# Patient Record
Sex: FEMALE | Race: WHITE | NOT HISPANIC OR LATINO | URBAN - METROPOLITAN AREA
[De-identification: names, ages, dates, MRNs, and addresses within clinical notes are randomized per-mention and may not be internally consistent; named-entity substitution may affect disease eponyms.]

---

## 2018-06-15 ENCOUNTER — EMERGENCY (EMERGENCY)
Facility: HOSPITAL | Age: 68
LOS: 1 days | Discharge: HOME | End: 2018-06-15
Attending: EMERGENCY MEDICINE | Admitting: EMERGENCY MEDICINE
Payer: MEDICARE

## 2018-06-15 VITALS
SYSTOLIC BLOOD PRESSURE: 161 MMHG | TEMPERATURE: 98 F | HEART RATE: 69 BPM | RESPIRATION RATE: 20 BRPM | OXYGEN SATURATION: 97 % | DIASTOLIC BLOOD PRESSURE: 81 MMHG

## 2018-06-15 VITALS — HEIGHT: 61 IN | WEIGHT: 179.9 LBS

## 2018-06-15 PROCEDURE — 93971 EXTREMITY STUDY: CPT | Mod: 26,RT

## 2018-06-15 PROCEDURE — 93971 EXTREMITY STUDY: CPT

## 2018-06-15 PROCEDURE — 99284 EMERGENCY DEPT VISIT MOD MDM: CPT

## 2018-06-15 PROCEDURE — 99284 EMERGENCY DEPT VISIT MOD MDM: CPT | Mod: 25

## 2018-06-15 RX ORDER — KETOROLAC TROMETHAMINE 30 MG/ML
30 SYRINGE (ML) INJECTION ONCE
Qty: 0 | Refills: 0 | Status: COMPLETED | OUTPATIENT
Start: 2018-06-15 | End: 2018-06-15

## 2018-06-15 NOTE — ED STATDOCS - CHPI ED SYMPTOM NEG
no fever/no nausea/no hematuria/no dysuria/no vomiting/no weakness/no palpitations/no chills/no decreased eating/drinking/no numbness

## 2018-06-15 NOTE — ED ADULT NURSE REASSESSMENT NOTE - NS ED NURSE REASSESS COMMENT FT1
pt a+ox3, sitting comfortably in bed. pt awaiting sono, denies any pain @ this time. will continue to monitor.

## 2018-06-15 NOTE — ED ADULT NURSE NOTE - OBJECTIVE STATEMENT
went to [facility] health ans was told to come to er for ultrasound of right leg - more increased swelling - per patient right leg has metal and previous surgeries. states that the swelling is greater then normal

## 2018-06-15 NOTE — ED STATDOCS - OBJECTIVE STATEMENT
USOH until about two days ago when noted increase leg swelling, pain, and slight limp.  History of plates and screws for fracture of affected extremity, denies new trauma, fall, lifting.  Takes a waterpill for htn/leg edema, compliant with meds.  Denies chest pain, sob, recent travel.  W[place] [facility] [place] [place] family member's urging, referred to ED for study to evaluate for DVT.

## 2018-06-15 NOTE — ED STATDOCS - MEDICAL DECISION MAKING DETAILS
leg swelling, right  [place] [facility] [place]  dopplers, pain meds  check results, reassess, f/u

## 2023-06-08 NOTE — ED STATDOCS - SPINE APPEARANCE, MLM
Requested the result copy forward to Dr. Rush and DEBBIE Crouch to review to adjust the dosage of prednisone accordingly.   spine normal

## 2024-02-09 ENCOUNTER — EMERGENCY (EMERGENCY)
Facility: HOSPITAL | Age: 74
LOS: 1 days | Discharge: HOME | End: 2024-02-09
Attending: EMERGENCY MEDICINE
Payer: MEDICARE

## 2024-02-09 VITALS
HEIGHT: 61 IN | TEMPERATURE: 98 F | WEIGHT: 169.98 LBS | RESPIRATION RATE: 20 BRPM | HEART RATE: 100 BPM | DIASTOLIC BLOOD PRESSURE: 85 MMHG | SYSTOLIC BLOOD PRESSURE: 152 MMHG | OXYGEN SATURATION: 96 %

## 2024-02-09 PROBLEM — I10 ESSENTIAL (PRIMARY) HYPERTENSION: Chronic | Status: ACTIVE | Noted: 2018-06-15

## 2024-02-09 PROBLEM — M19.90 UNSPECIFIED OSTEOARTHRITIS, UNSPECIFIED SITE: Chronic | Status: ACTIVE | Noted: 2018-06-15

## 2024-02-09 LAB
ALBUMIN SERPL ELPH-MCNC: 3.9 G/DL — SIGNIFICANT CHANGE UP (ref 3.3–5.2)
ALP SERPL-CCNC: 86 U/L — SIGNIFICANT CHANGE UP (ref 40–120)
ALT FLD-CCNC: 9 U/L — SIGNIFICANT CHANGE UP
ANION GAP SERPL CALC-SCNC: 13 MMOL/L — SIGNIFICANT CHANGE UP (ref 5–17)
AST SERPL-CCNC: 20 U/L — SIGNIFICANT CHANGE UP
BASE EXCESS BLDV CALC-SCNC: 4.7 MMOL/L — HIGH (ref -2–3)
BASOPHILS # BLD AUTO: 0.05 K/UL — SIGNIFICANT CHANGE UP (ref 0–0.2)
BASOPHILS NFR BLD AUTO: 0.8 % — SIGNIFICANT CHANGE UP (ref 0–2)
BILIRUB SERPL-MCNC: 0.4 MG/DL — SIGNIFICANT CHANGE UP (ref 0.4–2)
BUN SERPL-MCNC: 12.7 MG/DL — SIGNIFICANT CHANGE UP (ref 8–20)
CA-I SERPL-SCNC: 1.15 MMOL/L — SIGNIFICANT CHANGE UP (ref 1.15–1.33)
CALCIUM SERPL-MCNC: 9.4 MG/DL — SIGNIFICANT CHANGE UP (ref 8.4–10.5)
CHLORIDE BLDV-SCNC: 100 MMOL/L — SIGNIFICANT CHANGE UP (ref 96–108)
CHLORIDE SERPL-SCNC: 97 MMOL/L — SIGNIFICANT CHANGE UP (ref 96–108)
CO2 SERPL-SCNC: 26 MMOL/L — SIGNIFICANT CHANGE UP (ref 22–29)
CREAT SERPL-MCNC: 0.56 MG/DL — SIGNIFICANT CHANGE UP (ref 0.5–1.3)
CRP SERPL-MCNC: 29 MG/L — HIGH
EGFR: 98 ML/MIN/1.73M2 — SIGNIFICANT CHANGE UP
EOSINOPHIL # BLD AUTO: 0.18 K/UL — SIGNIFICANT CHANGE UP (ref 0–0.5)
EOSINOPHIL NFR BLD AUTO: 2.8 % — SIGNIFICANT CHANGE UP (ref 0–6)
ERYTHROCYTE [SEDIMENTATION RATE] IN BLOOD: 25 MM/HR — HIGH (ref 0–20)
GAS PNL BLDV: 134 MMOL/L — LOW (ref 136–145)
GAS PNL BLDV: SIGNIFICANT CHANGE UP
GLUCOSE BLDV-MCNC: 114 MG/DL — HIGH (ref 70–99)
GLUCOSE SERPL-MCNC: 102 MG/DL — HIGH (ref 70–99)
HCO3 BLDV-SCNC: 30 MMOL/L — HIGH (ref 22–29)
HCT VFR BLD CALC: 43.2 % — SIGNIFICANT CHANGE UP (ref 34.5–45)
HCT VFR BLDA CALC: 44 % — SIGNIFICANT CHANGE UP
HGB BLD CALC-MCNC: 14.5 G/DL — SIGNIFICANT CHANGE UP (ref 11.7–16.1)
HGB BLD-MCNC: 14.2 G/DL — SIGNIFICANT CHANGE UP (ref 11.5–15.5)
IMM GRANULOCYTES NFR BLD AUTO: 0.2 % — SIGNIFICANT CHANGE UP (ref 0–0.9)
LACTATE BLDV-MCNC: 1.1 MMOL/L — SIGNIFICANT CHANGE UP (ref 0.5–2)
LYMPHOCYTES # BLD AUTO: 1.6 K/UL — SIGNIFICANT CHANGE UP (ref 1–3.3)
LYMPHOCYTES # BLD AUTO: 24.8 % — SIGNIFICANT CHANGE UP (ref 13–44)
MCHC RBC-ENTMCNC: 28.5 PG — SIGNIFICANT CHANGE UP (ref 27–34)
MCHC RBC-ENTMCNC: 32.9 GM/DL — SIGNIFICANT CHANGE UP (ref 32–36)
MCV RBC AUTO: 86.6 FL — SIGNIFICANT CHANGE UP (ref 80–100)
MONOCYTES # BLD AUTO: 0.82 K/UL — SIGNIFICANT CHANGE UP (ref 0–0.9)
MONOCYTES NFR BLD AUTO: 12.7 % — SIGNIFICANT CHANGE UP (ref 2–14)
NEUTROPHILS # BLD AUTO: 3.8 K/UL — SIGNIFICANT CHANGE UP (ref 1.8–7.4)
NEUTROPHILS NFR BLD AUTO: 58.7 % — SIGNIFICANT CHANGE UP (ref 43–77)
PCO2 BLDV: 49 MMHG — HIGH (ref 39–42)
PH BLDV: 7.39 — SIGNIFICANT CHANGE UP (ref 7.32–7.43)
PLATELET # BLD AUTO: 208 K/UL — SIGNIFICANT CHANGE UP (ref 150–400)
PO2 BLDV: 57 MMHG — HIGH (ref 25–45)
POTASSIUM BLDV-SCNC: 3.8 MMOL/L — SIGNIFICANT CHANGE UP (ref 3.5–5.1)
POTASSIUM SERPL-MCNC: 3.7 MMOL/L — SIGNIFICANT CHANGE UP (ref 3.5–5.3)
POTASSIUM SERPL-SCNC: 3.7 MMOL/L — SIGNIFICANT CHANGE UP (ref 3.5–5.3)
PROT SERPL-MCNC: 7.5 G/DL — SIGNIFICANT CHANGE UP (ref 6.6–8.7)
RAPID RVP RESULT: DETECTED
RBC # BLD: 4.99 M/UL — SIGNIFICANT CHANGE UP (ref 3.8–5.2)
RBC # FLD: 13.5 % — SIGNIFICANT CHANGE UP (ref 10.3–14.5)
SAO2 % BLDV: 85.7 % — SIGNIFICANT CHANGE UP
SARS-COV-2 RNA SPEC QL NAA+PROBE: DETECTED
SODIUM SERPL-SCNC: 136 MMOL/L — SIGNIFICANT CHANGE UP (ref 135–145)
WBC # BLD: 6.46 K/UL — SIGNIFICANT CHANGE UP (ref 3.8–10.5)
WBC # FLD AUTO: 6.46 K/UL — SIGNIFICANT CHANGE UP (ref 3.8–10.5)

## 2024-02-09 PROCEDURE — 93010 ELECTROCARDIOGRAM REPORT: CPT

## 2024-02-09 PROCEDURE — 99285 EMERGENCY DEPT VISIT HI MDM: CPT

## 2024-02-09 PROCEDURE — 73610 X-RAY EXAM OF ANKLE: CPT | Mod: 26,LT

## 2024-02-09 PROCEDURE — 71045 X-RAY EXAM CHEST 1 VIEW: CPT | Mod: 26

## 2024-02-09 PROCEDURE — 73610 X-RAY EXAM OF ANKLE: CPT | Mod: 26,RT,77

## 2024-02-09 RX ORDER — VANCOMYCIN HCL 1 G
1000 VIAL (EA) INTRAVENOUS ONCE
Refills: 0 | Status: COMPLETED | OUTPATIENT
Start: 2024-02-09 | End: 2024-02-09

## 2024-02-09 RX ADMIN — Medication 250 MILLIGRAM(S): at 16:59

## 2024-02-09 NOTE — ED PROVIDER NOTE - OBJECTIVE STATEMENT
73y female w/ pmh of HTN presenting with RLE wound and swelling. patients family first noticed this yesterday as she was no longer able to walk due to pain. patient states she has had symptoms for at least a week. denies any recent trauma or falls. patient has hx of ankle ORIF, she is unsure which foot. denies fever, SOB, chest pain, abd pain, HA, neck pain, N/V/D, or urinary symptoms. 73y female w/ pmh of HTN presenting with RLE wound and swelling. patients family first noticed this yesterday as she was no longer able to walk due to pain. patient states she has had symptoms for at least a week. denies any recent trauma or falls. patient has hx of right ankle ORIF. denies fever, SOB, chest pain, abd pain, HA, neck pain, N/V/D, or urinary symptoms.

## 2024-02-09 NOTE — ED PROVIDER NOTE - NS ED ROS FT
General: Denies fever, chills  HEENT: Denies sore throat  Neck: Denies neck pain  Resp: Denies coughing, SOB  Cardiovascular: Denies CP, palpitations, LE edema  GI: Denies nausea, vomiting, abdominal pain, diarrhea, constipation, blood in stool  : Denies dysuria, hematuria  MSK: Denies back pain. right leg pain and swelling  Neuro: Denies HA, dizziness, numbness, weakness  Skin: Denies rashes.

## 2024-02-09 NOTE — ED PROVIDER NOTE - ATTENDING CONTRIBUTION TO CARE
I, Samuel Wilson MD, performed the initial face to face bedside interview with this patient regarding history of present illness, review of symptoms and relevant past medical, social and family history.  I completed an independent physical examination.  I was the initial provider who evaluated this patient. I have signed out the follow up of any pending tests (i.e. labs, radiological studies) to the resident.  I have communicated the patient’s plan of care and disposition with the resident.

## 2024-02-09 NOTE — ED ADULT NURSE NOTE - OBJECTIVE STATEMENT
Pt c/o bilateral leg pain and swelling. Wound on R lower leg. Pt states she is unsure when it appeared. Pt states it is hard to ambulate at baseline. C/o chills. Denies fevers, cp, SOB, N/V/D.

## 2024-02-09 NOTE — ED ADULT NURSE NOTE - NSFALLRISKINTERV_ED_ALL_ED

## 2024-02-09 NOTE — ED PROVIDER NOTE - CLINICAL SUMMARY MEDICAL DECISION MAKING FREE TEXT BOX
73y female w/ pmh of HTN presenting with RLE wound and swelling. patients family first noticed this yesterday as she was no longer able to walk due to pain. patient states she has had symptoms for at least a week. denies any recent trauma or falls. patient has hx of ankle ORIF, she is unsure which foot. denies fever, SOB, chest pain, abd pain, HA, neck pain, N/V/D, or urinary symptoms. 73y female w/ pmh of HTN presenting with RLE wound and swelling. patients family first noticed this yesterday as she was no longer able to walk due to pain. patient states she has had symptoms for at least a week. denies any recent trauma or falls. patient has hx of right ankle ORIF. denies fever, SOB, chest pain, abd pain, HA, neck pain, N/V/D, or urinary symptoms. 73y female w/ pmh of HTN presenting with RLE wound and swelling. patients family first noticed this yesterday as she was no longer able to walk due to pain. patient states she has had symptoms for at least a week. denies any recent trauma or falls. patient has hx of right ankle ORIF. patient well appearing on exam, nontoxic, stable vitals, RLE concerning for cellulitis. no acute findings on CBC, CMP, VBG. vancomycin started. CT ordered. will need observation vs admission for IV abx and PT consult as patient us unable to bear weight.

## 2024-02-09 NOTE — ED PROVIDER NOTE - PHYSICAL EXAMINATION
General: Awake, alert, lying in bed in NAD  HEENT: Normocephalic, atraumatic. No scleral icterus or conjunctival injection. EOMI. dry mucous membranes. Oropharynx clear.   Neck:. Soft and supple.  Cardiac: RRR, Peripheral pulses 2+ and symmetric.   Resp: Lungs CTAB. No accessory muscle use  Abd: Soft, non-tender, non-distended. No guarding, rebound, or rigidity.  Back: Spine midline and non-tender.   MSK: left ankle tenderness.   Skin: No rashes, abrasions, or lacerations. right ankle with medial and lateral wounds with dried purulent drainage. erythema and warmth to the right foot and ankle extending to the mid calf with fluctuance.   Neuro: AO x 4. Moves all extremities symmetrically. Motor strength and sensation grossly intact.  Psych: Appropriate mood and affect General: Awake, alert, lying in bed in NAD  HEENT: Normocephalic, atraumatic. No scleral icterus or conjunctival injection. EOMI. dry mucous membranes. Oropharynx clear.   Neck:. Soft and supple.  Cardiac: RRR, Peripheral pulses 2+ and symmetric.   Resp: Lungs CTAB. No accessory muscle use  Abd: Soft, non-tender, non-distended. No guarding, rebound, or rigidity.  Back: Spine midline and non-tender.   MSK: left ankle tenderness.   Skin: No rashes, abrasions, or lacerations. right ankle with medial and lateral wounds. erythema and warmth to the right foot and ankle extending to the mid calf with fluctuance.   Neuro: AO x 4. Moves all extremities symmetrically. Motor strength and sensation grossly intact.  Psych: Appropriate mood and affect

## 2024-02-10 PROCEDURE — 73701 CT LOWER EXTREMITY W/DYE: CPT | Mod: 26,RT,MA

## 2024-02-10 PROCEDURE — 99222 1ST HOSP IP/OBS MODERATE 55: CPT

## 2024-02-10 RX ORDER — VALSARTAN 80 MG/1
320 TABLET ORAL DAILY
Refills: 0 | Status: DISCONTINUED | OUTPATIENT
Start: 2024-02-10 | End: 2024-02-17

## 2024-02-10 RX ORDER — ACETAMINOPHEN 500 MG
650 TABLET ORAL ONCE
Refills: 0 | Status: COMPLETED | OUTPATIENT
Start: 2024-02-10 | End: 2024-02-10

## 2024-02-10 RX ORDER — BUPRENORPHINE AND NALOXONE 2; .5 MG/1; MG/1
1 TABLET SUBLINGUAL DAILY
Refills: 0 | Status: COMPLETED | OUTPATIENT
Start: 2024-02-10 | End: 2024-02-17

## 2024-02-10 RX ADMIN — Medication 100 MILLIGRAM(S): at 21:54

## 2024-02-10 RX ADMIN — Medication 100 MILLIGRAM(S): at 06:41

## 2024-02-10 RX ADMIN — Medication 650 MILLIGRAM(S): at 16:54

## 2024-02-10 RX ADMIN — VALSARTAN 320 MILLIGRAM(S): 80 TABLET ORAL at 13:03

## 2024-02-10 RX ADMIN — Medication 100 MILLIGRAM(S): at 15:02

## 2024-02-10 RX ADMIN — BUPRENORPHINE AND NALOXONE 1 FILM(S): 2; .5 TABLET SUBLINGUAL at 13:03

## 2024-02-10 NOTE — ED CDU PROVIDER INITIAL DAY NOTE - CLINICAL SUMMARY MEDICAL DECISION MAKING FREE TEXT BOX
PT with cellulitis placed in obs for Diagnostic uncertainty. PT seen BY OBS PA found to be appropriate CDU PT care transferred to PA.

## 2024-02-10 NOTE — ED CDU PROVIDER INITIAL DAY NOTE - PROGRESS NOTE DETAILS
patient resting comfortably, eating, no complaints, denies shortness of breath, dressing on right lower leg changed with xeroform, dry gauze and kerlex

## 2024-02-10 NOTE — ED CDU PROVIDER INITIAL DAY NOTE - PHYSICAL EXAMINATION
General: Awake, alert, lying in bed in NAD  HEENT: Normocephalic, atraumatic. No scleral icterus or conjunctival injection. EOMI. dry mucous membranes. Oropharynx clear.   Neck:. Soft and supple.  Cardiac: RRR, Peripheral pulses 2+ and symmetric.   Resp: Lungs CTAB. No accessory muscle use  Abd: Soft, non-tender, non-distended. No guarding, rebound, or rigidity.  Back: Spine midline and non-tender.   MSK: left ankle tenderness.   Skin: No rashes, abrasions, or lacerations. right ankle with medial and lateral wounds. erythema and warmth to the right foot and ankle extending to the mid calf with fluctuance.   Neuro: AO x 4. Moves all extremities symmetrically. Motor strength and sensation grossly intact.  Psych: Appropriate mood and affect

## 2024-02-11 LAB
ANION GAP SERPL CALC-SCNC: 14 MMOL/L — SIGNIFICANT CHANGE UP (ref 5–17)
BASOPHILS # BLD AUTO: 0.04 K/UL — SIGNIFICANT CHANGE UP (ref 0–0.2)
BASOPHILS NFR BLD AUTO: 0.7 % — SIGNIFICANT CHANGE UP (ref 0–2)
BUN SERPL-MCNC: 8.2 MG/DL — SIGNIFICANT CHANGE UP (ref 8–20)
CALCIUM SERPL-MCNC: 7.1 MG/DL — LOW (ref 8.4–10.5)
CHLORIDE SERPL-SCNC: 101 MMOL/L — SIGNIFICANT CHANGE UP (ref 96–108)
CO2 SERPL-SCNC: 19 MMOL/L — LOW (ref 22–29)
CREAT SERPL-MCNC: 0.38 MG/DL — LOW (ref 0.5–1.3)
CRP SERPL-MCNC: 29 MG/L — HIGH
EGFR: 107 ML/MIN/1.73M2 — SIGNIFICANT CHANGE UP
EOSINOPHIL # BLD AUTO: 0.34 K/UL — SIGNIFICANT CHANGE UP (ref 0–0.5)
EOSINOPHIL NFR BLD AUTO: 6.2 % — HIGH (ref 0–6)
ERYTHROCYTE [SEDIMENTATION RATE] IN BLOOD: 22 MM/HR — HIGH (ref 0–20)
GLUCOSE SERPL-MCNC: 204 MG/DL — HIGH (ref 70–99)
HCT VFR BLD CALC: 40 % — SIGNIFICANT CHANGE UP (ref 34.5–45)
HGB BLD-MCNC: 12.8 G/DL — SIGNIFICANT CHANGE UP (ref 11.5–15.5)
IMM GRANULOCYTES NFR BLD AUTO: 0.7 % — SIGNIFICANT CHANGE UP (ref 0–0.9)
LYMPHOCYTES # BLD AUTO: 1.47 K/UL — SIGNIFICANT CHANGE UP (ref 1–3.3)
LYMPHOCYTES # BLD AUTO: 26.9 % — SIGNIFICANT CHANGE UP (ref 13–44)
MCHC RBC-ENTMCNC: 27.5 PG — SIGNIFICANT CHANGE UP (ref 27–34)
MCHC RBC-ENTMCNC: 32 GM/DL — SIGNIFICANT CHANGE UP (ref 32–36)
MCV RBC AUTO: 85.8 FL — SIGNIFICANT CHANGE UP (ref 80–100)
MONOCYTES # BLD AUTO: 0.52 K/UL — SIGNIFICANT CHANGE UP (ref 0–0.9)
MONOCYTES NFR BLD AUTO: 9.5 % — SIGNIFICANT CHANGE UP (ref 2–14)
NEUTROPHILS # BLD AUTO: 3.05 K/UL — SIGNIFICANT CHANGE UP (ref 1.8–7.4)
NEUTROPHILS NFR BLD AUTO: 56 % — SIGNIFICANT CHANGE UP (ref 43–77)
PLATELET # BLD AUTO: 182 K/UL — SIGNIFICANT CHANGE UP (ref 150–400)
POTASSIUM SERPL-MCNC: 4.4 MMOL/L — SIGNIFICANT CHANGE UP (ref 3.5–5.3)
POTASSIUM SERPL-SCNC: 4.4 MMOL/L — SIGNIFICANT CHANGE UP (ref 3.5–5.3)
RBC # BLD: 4.66 M/UL — SIGNIFICANT CHANGE UP (ref 3.8–5.2)
RBC # FLD: 13.6 % — SIGNIFICANT CHANGE UP (ref 10.3–14.5)
SODIUM SERPL-SCNC: 134 MMOL/L — LOW (ref 135–145)
WBC # BLD: 5.46 K/UL — SIGNIFICANT CHANGE UP (ref 3.8–10.5)
WBC # FLD AUTO: 5.46 K/UL — SIGNIFICANT CHANGE UP (ref 3.8–10.5)

## 2024-02-11 PROCEDURE — 99232 SBSQ HOSP IP/OBS MODERATE 35: CPT

## 2024-02-11 RX ORDER — ALPRAZOLAM 0.25 MG
0.25 TABLET ORAL DAILY
Refills: 0 | Status: DISCONTINUED | OUTPATIENT
Start: 2024-02-11 | End: 2024-02-11

## 2024-02-11 RX ORDER — SACCHAROMYCES BOULARDII 250 MG
250 POWDER IN PACKET (EA) ORAL
Refills: 0 | Status: DISCONTINUED | OUTPATIENT
Start: 2024-02-11 | End: 2024-02-17

## 2024-02-11 RX ADMIN — Medication 0.25 MILLIGRAM(S): at 22:08

## 2024-02-11 RX ADMIN — BUPRENORPHINE AND NALOXONE 1 FILM(S): 2; .5 TABLET SUBLINGUAL at 11:37

## 2024-02-11 RX ADMIN — Medication 100 MILLIGRAM(S): at 21:32

## 2024-02-11 RX ADMIN — Medication 100 MILLIGRAM(S): at 14:29

## 2024-02-11 RX ADMIN — Medication 100 MILLIGRAM(S): at 05:09

## 2024-02-11 RX ADMIN — VALSARTAN 320 MILLIGRAM(S): 80 TABLET ORAL at 05:10

## 2024-02-11 NOTE — PHYSICAL THERAPY INITIAL EVALUATION ADULT - PERTINENT HX OF CURRENT PROBLEM, REHAB EVAL
as per medical chart: presenting with RLE wound and swelling. patients family first noticed this yesterday as she was no longer able to walk due to pain. patient states she has had symptoms for at least a week.

## 2024-02-11 NOTE — PHYSICAL THERAPY INITIAL EVALUATION ADULT - ADDITIONAL COMMENTS
as per medical chart: resides at home with one step to enter (threshold through the door), (-) DME, reports moving around the house holding to the walls/furniture, (+) Family support upon D/C home

## 2024-02-11 NOTE — PROVIDER CONTACT NOTE (OTHER) - ASSESSMENT
Pt's chart reviewed, content noted initial eval completed, refer to it for details about pt's functional status. Pt received and left on the stretcher, set with breakfast,  lines intact and active, precautions maintained and reinforced, c/o of feeling upon standing "woozy",  no pain reported 0/10 pre, during and post session, NAD, RN and pt in agreement to actively participates in PT evaluation, RN made aware of the evaluation outcome, PT will follow.

## 2024-02-11 NOTE — PROVIDER CONTACT NOTE (OTHER) - BACKGROUND
Pt. resides at home with one step to enter (threshold through the door), (-) DME, reports moving around the house holding to the walls/furniture, (+) Family support upon D/C home

## 2024-02-11 NOTE — PHYSICAL THERAPY INITIAL EVALUATION ADULT - IMPAIRED TRANSFERS: SIT/STAND, REHAB EVAL
c/o feeling "woozy" pt is asking for breakfast/impaired balance/decreased flexibility/decreased strength

## 2024-02-12 VITALS
SYSTOLIC BLOOD PRESSURE: 157 MMHG | TEMPERATURE: 98 F | OXYGEN SATURATION: 98 % | RESPIRATION RATE: 20 BRPM | HEART RATE: 77 BPM | DIASTOLIC BLOOD PRESSURE: 70 MMHG

## 2024-02-12 PROCEDURE — G0378: CPT

## 2024-02-12 PROCEDURE — 93005 ELECTROCARDIOGRAM TRACING: CPT

## 2024-02-12 PROCEDURE — 73610 X-RAY EXAM OF ANKLE: CPT

## 2024-02-12 PROCEDURE — 96374 THER/PROPH/DIAG INJ IV PUSH: CPT | Mod: XU

## 2024-02-12 PROCEDURE — 0225U NFCT DS DNA&RNA 21 SARSCOV2: CPT

## 2024-02-12 PROCEDURE — 99285 EMERGENCY DEPT VISIT HI MDM: CPT | Mod: 25

## 2024-02-12 PROCEDURE — 87040 BLOOD CULTURE FOR BACTERIA: CPT

## 2024-02-12 PROCEDURE — 96376 TX/PRO/DX INJ SAME DRUG ADON: CPT

## 2024-02-12 PROCEDURE — 80048 BASIC METABOLIC PNL TOTAL CA: CPT

## 2024-02-12 PROCEDURE — 80053 COMPREHEN METABOLIC PANEL: CPT

## 2024-02-12 PROCEDURE — 85025 COMPLETE CBC W/AUTO DIFF WBC: CPT

## 2024-02-12 PROCEDURE — 36415 COLL VENOUS BLD VENIPUNCTURE: CPT

## 2024-02-12 PROCEDURE — 84295 ASSAY OF SERUM SODIUM: CPT

## 2024-02-12 PROCEDURE — 73701 CT LOWER EXTREMITY W/DYE: CPT | Mod: MA

## 2024-02-12 PROCEDURE — 82330 ASSAY OF CALCIUM: CPT

## 2024-02-12 PROCEDURE — 96375 TX/PRO/DX INJ NEW DRUG ADDON: CPT

## 2024-02-12 PROCEDURE — 71045 X-RAY EXAM CHEST 1 VIEW: CPT

## 2024-02-12 PROCEDURE — 82947 ASSAY GLUCOSE BLOOD QUANT: CPT

## 2024-02-12 PROCEDURE — 85014 HEMATOCRIT: CPT

## 2024-02-12 PROCEDURE — 82803 BLOOD GASES ANY COMBINATION: CPT

## 2024-02-12 PROCEDURE — 84132 ASSAY OF SERUM POTASSIUM: CPT

## 2024-02-12 PROCEDURE — 82435 ASSAY OF BLOOD CHLORIDE: CPT

## 2024-02-12 PROCEDURE — 83605 ASSAY OF LACTIC ACID: CPT

## 2024-02-12 PROCEDURE — 85018 HEMOGLOBIN: CPT

## 2024-02-12 PROCEDURE — 85652 RBC SED RATE AUTOMATED: CPT

## 2024-02-12 PROCEDURE — 86140 C-REACTIVE PROTEIN: CPT

## 2024-02-12 PROCEDURE — 99238 HOSP IP/OBS DSCHRG MGMT 30/<: CPT

## 2024-02-12 RX ADMIN — Medication 100 MILLIGRAM(S): at 05:16

## 2024-02-12 RX ADMIN — Medication 250 MILLIGRAM(S): at 06:48

## 2024-02-12 RX ADMIN — VALSARTAN 320 MILLIGRAM(S): 80 TABLET ORAL at 05:17

## 2024-02-12 NOTE — ED CDU PROVIDER DISPOSITION NOTE - NSFOLLOWUPINSTRUCTIONS_ED_ALL_ED_FT
- Return to the ED for any new or worsening symptoms.   - Please complete course of antibiotics   - Keep area clean, wash multiple times per day with soap and water  - Follow-up with orthopedic doctor listed for further evaluation of "osteochondral lesion of medial femoral condyle" seen on CT scan    Cellulitis    Cellulitis is a skin infection caused by bacteria. This condition occurs most often in the arms and lower legs but can occur anywhere over the body. Symptoms include redness, swelling, warm skin, tenderness, and chills/fever. If you were prescribed an antibiotic medicine, take it as told by your health care provider. Do not stop taking the antibiotic even if you start to feel better.    SEEK IMMEDIATE MEDICAL CARE IF YOU HAVE ANY OF THE FOLLOWING SYMPTOMS: worsening fever, red streaks coming from affected area, vomiting or diarrhea, or dizziness/lightheadedness.     - Return to ED for any new or worsening symptoms including but not limited to chest pain, difficulty breathing, persistent vomiting, intractable dizziness, etc  - May take ibuprofen 600mg every 6 hours and/or tylenol 650mg every 6 hours as needed for pain and/or fevers  - A viral panel was performed during your ED visit. Please follow the instructions provided in your discharge paperwork to set up and access your patient portal to obtain these results  - Drink plenty of fluids to remain hydrated, i.e water, pedialyte, gatorade  - Wash hands frequently with warm soapy water    Viral Respiratory Infection    A viral respiratory infection is an illness that affects parts of the body used for breathing, like the lungs, nose, and throat. It is caused by a germ called a virus. Symptoms can include runny nose, coughing, sneezing, fatigue, body aches, sore throat, fever, or headache. Over the counter medicine can be used to manage the symptoms but the infection typically goes away on its own in 5 to 10 days.     SEEK IMMEDIATE MEDICAL CARE IF YOU HAVE ANY OF THE FOLLOWING SYMPTOMS: shortness of breath, chest pain, fever over 10 days, or lightheadedness/dizziness. - Return to the ED for any new or worsening symptoms.   - Please complete course of antibiotics   - Keep area clean, wash multiple times per day with soap and water  - Follow-up with orthopedic doctor listed for further evaluation of "osteochondral lesion of medial femoral condyle" seen on CT scan    Cellulitis    Cellulitis is a skin infection caused by bacteria. This condition occurs most often in the arms and lower legs but can occur anywhere over the body. Symptoms include redness, swelling, warm skin, tenderness, and chills/fever. If you were prescribed an antibiotic medicine, take it as told by your health care provider. Do not stop taking the antibiotic even if you start to feel better.    SEEK IMMEDIATE MEDICAL CARE IF YOU HAVE ANY OF THE FOLLOWING SYMPTOMS: worsening fever, red streaks coming from affected area, vomiting or diarrhea, or dizziness/lightheadedness.     - Return to ED for any new or worsening symptoms including but not limited to chest pain, difficulty breathing, persistent vomiting, intractable dizziness, etc  - May take ibuprofen 600mg every 6 hours and/or tylenol 650mg every 6 hours as needed for pain and/or fevers  - A viral panel was performed during your ED visit. Please follow the instructions provided in your discharge paperwork to set up and access your patient portal to obtain these results  - Drink plenty of fluids to remain hydrated, i.e water, pedialyte, gatorade  - Wash hands frequently with warm soapy water    Viral Respiratory Infection    A viral respiratory infection is an illness that affects parts of the body used for breathing, like the lungs, nose, and throat. It is caused by a germ called a virus. Symptoms can include runny nose, coughing, sneezing, fatigue, body aches, sore throat, fever, or headache. Over the counter medicine can be used to manage the symptoms but the infection typically goes away on its own in 5 to 10 days.     SEEK IMMEDIATE MEDICAL CARE IF YOU HAVE ANY OF THE FOLLOWING SYMPTOMS: shortness of breath, chest pain, fever over 10 days, or lightheadedness/dizziness.    Pt requires skilled nursing for local wound care and family teaching of wound, new meds, etc. Pt will benefit from home physical therapy d/t weakness and falls.     Local wound care to RLE as follows: irrigate w/ ns, pat dry, apply xeroform, cover w/ abd, secure w/ Kerlix / tape.

## 2024-02-12 NOTE — ED CDU PROVIDER SUBSEQUENT DAY NOTE - HISTORY
Pt resting comfortably at time of re-assessment. No events overnight. Pending PT. Will continue to monitor.
no event over night .  right lower extremity  recheck with improvement of the redness.  ulceration on the right distal shin noted.  continue IV antibiotics and reevaluation in the morning

## 2024-02-12 NOTE — ED CDU PROVIDER DISPOSITION NOTE - ATTENDING CONTRIBUTION TO CARE
I, Dwight Aguiar, performed the initial face to face bedside interview with this patient regarding history of present illness, review of symptoms and relevant past medical, social and family history.  I completed an independent physical examination.  I was the initial provider who evaluated this patient. I have signed out the follow up of any pending tests (i.e. labs, radiological studies) to the ACP.  I have communicated the patient’s plan of care and disposition with the ACP.

## 2024-02-12 NOTE — ED CDU PROVIDER SUBSEQUENT DAY NOTE - CLINICAL SUMMARY MEDICAL DECISION MAKING FREE TEXT BOX
PT with cellulitis placed in obs for IV abx, started on clindamycin.
- cellulitis of right lower extremity is improved with IV antibiotics  - patient positive for  COVID. fever intermittent. otherwise patient denies any fever or chills  - continue  use Xeroform over the ulceration area on right lower extremity

## 2024-02-12 NOTE — ED ADULT NURSE REASSESSMENT NOTE - NS ED NURSE REASSESS COMMENT FT1
Assumed care of Pt. Pt resting in stretcher. Medications given as prescribed. Bed locked in lowest position with side rails raised.
Assumed care of pt from DICK Macias.    Pt laying in bed, talking on phone. Pt A&Ox4 in NAD @ this time. RR even & unlabored. Pt NSR on cardiac monitor. Pt noted to have R lower leg redness & warmth. Pt denies fever, injury, n/v, pain, cough, sob. Pt denies any needs @ this time. Pt awaiting PT. Safety maintained.
Patient a&ox3 introduced to oncoming RN, VSSs in no acute distress, RR even/nonlabored, resting comfortably in bed. Denies headache, dizziness, chest pain, palpitations, SOB, abd pain, n/v/d, urinary symptoms, fevers, chills, weakness at this time. Safety maintained.
Patient a&ox3, no acute distress, resp nonlabored, resting comfortably in bed. Denies headache, dizziness, chest pain, palpitations, SOB, abd pain, n/v/d, urinary symptoms at this time. Patient awaiting . Safety maintained. remains in observations for IV abx related to RLE swelling/cellulitits.  wdl on ra & continuos cardiac monitoring nsr,
Pt awake and alert. Resting in stretcher. Denies complaints. Respirations even and unlabored.
Pt received in bed. Pt alert and oriented x4. ADL care completed. Vitals stable. Pt denies pain at this time. No distress noted. Support onoging. Veronica
Pt resting comfortable. Alert and awake. Denies complaints. Respirations even and unlabored.
Report given to Autumn RN & Emily, RN.     Pt laying in bed, A&Ox4 in NAD @ this time. RR even & unlabored. Pt NSR on cardiac monitor. Pt in OBS. Safety maintained.
assumed care of this patient at 1920 hours. discussed all medical information with the off going nurse. This patient is resting comfortably in bed no apparent distress noted at this time. Air breathing circulation WNL. Denies pain. This nurse will continue to monitor.
pts family updated on plan of care for pt. emotional support provided to family. Family aware of plan and verbalizes understanding.
Assumed pt care at 0745. Pt A&Ox4, rr even and unlabored. Pt is resting comfortably in bed and has no pain or complaints at this time. NSR on monitor.
A&Ox4, rr even and unlabored. VSS. Pts LLE dressing changed. Pt resting in bed. No complaints at this time.
Pt resting in stetcher. Denies complaints. Medications given as prescribed. Respirations even and unlabored.
Pt awake resting comfortably. Denies complaints. Respirations even and unlabored

## 2024-02-12 NOTE — ED CDU PROVIDER SUBSEQUENT DAY NOTE - NS ED ROS FT
General: Denies fever, chills  HEENT: Denies sore throat  Neck: Denies neck pain  Resp: Denies coughing, SOB  Cardiovascular: Denies CP, palpitations, LE edema  GI: Denies nausea, vomiting, abdominal pain, diarrhea, constipation, blood in stool  : Denies dysuria, hematuria  MSK: Denies back pain. right leg pain and swelling  Neuro: Denies HA, dizziness, numbness, weakness  Skin: Denies rashes.
General: Denies fever, chills  HEENT: Denies sore throat  Neck: Denies neck pain  Resp: Denies coughing, SOB  Cardiovascular: Denies CP, palpitations, LE edema  GI: Denies nausea, vomiting, abdominal pain, diarrhea, constipation, blood in stool  : Denies dysuria, hematuria  MSK: Denies back pain. right leg pain and swelling  Neuro: Denies HA, dizziness, numbness, weakness  Skin: Denies rashes.

## 2024-02-12 NOTE — ED CDU PROVIDER SUBSEQUENT DAY NOTE - PHYSICAL EXAMINATION
General: Awake, alert, lying in bed in NAD  HEENT: Normocephalic, atraumatic. No scleral icterus or conjunctival injection. EOMI. dry mucous membranes. Oropharynx clear.   Neck:. Soft and supple.  Cardiac: RRR, Peripheral pulses 2+ and symmetric.   Resp: Lungs CTAB. No accessory muscle use  Abd: Soft, non-tender, non-distended. No guarding, rebound, or rigidity.  Back: Spine midline and non-tender.   MSK: left ankle tenderness.   Skin: No rashes, abrasions, or lacerations. right ankle with medial and lateral wounds. erythema and warmth to the right foot and ankle extending to the mid calf with fluctuance.   Neuro: AO x 4. Moves all extremities symmetrically. Motor strength and sensation grossly intact.  Psych: Appropriate mood and affect
General: Awake, alert, lying in bed in NAD  HEENT: Normocephalic, atraumatic. No scleral icterus or conjunctival injection. EOMI. dry mucous membranes. Oropharynx clear.   Neck:. Soft and supple.  Cardiac: RRR, Peripheral pulses 2+ and symmetric.   Resp: Lungs CTAB. No accessory muscle use  Abd: Soft, non-tender, non-distended. No guarding, rebound, or rigidity.  Back: Spine midline and non-tender.   MSK: left ankle tenderness.   Skin: No rashes, abrasions, or lacerations. right ankle with medial and lateral wounds. erythema and warmth to the right foot and ankle extending to the mid calf with fluctuance.   Neuro: AO x 4. Moves all extremities symmetrically. Motor strength and sensation grossly intact.  Psych: Appropriate mood and affect

## 2024-02-12 NOTE — ED CDU PROVIDER DISPOSITION NOTE - CLINICAL COURSE
73y female w/ pmh of HTN presenting with RLE wound and swelling with overlying cellulitis. Pt placed in obs for tx of cellulitis with IV clindamycin. Labs without emergent findings, no wbc elevation. Found to be covid positive, no hypoxia. Blood cultures no growth @ 48 hours. CT lower extremity consistent with cellulitis, also noting osteochondral lesion, pt advised to f/u for further eval. Physical therapy evaluated pt, recommending home with home PT. Cellulitic area significantly improved on re-assessment, sent home with rx for clindamycin.  set up wound care for pt. provided copy of all results. return precautions discussed

## 2024-02-12 NOTE — ED CDU PROVIDER SUBSEQUENT DAY NOTE - ATTENDING APP SHARED VISIT CONTRIBUTION OF CARE
Patient presenting with ulcerations and cellulitis.  Will treat with wound care and antibiotics and observe.  Stable at this time
I, Dwight Aguiar, performed the initial face to face bedside interview with this patient regarding history of present illness, review of symptoms and relevant past medical, social and family history.  I completed an independent physical examination.  I was the initial provider who evaluated this patient. I have signed out the follow up of any pending tests (i.e. labs, radiological studies) to the ACP.  I have communicated the patient’s plan of care and disposition with the ACP.

## 2024-02-12 NOTE — ED CDU PROVIDER SUBSEQUENT DAY NOTE - NS ED ATTENDING STATEMENT MOD
This was a shared visit with the [place]. I reviewed and verified the documentation.
This was a shared visit with the [place]. I reviewed and verified the documentation.

## 2024-02-12 NOTE — ED CDU PROVIDER SUBSEQUENT DAY NOTE - NSICDXPASTMEDICALHX_GEN_ALL_CORE_FT
PAST MEDICAL HISTORY:  Arthritis     HTN (hypertension)     
PAST MEDICAL HISTORY:  Arthritis     HTN (hypertension)

## 2024-02-12 NOTE — ED CDU PROVIDER SUBSEQUENT DAY NOTE - PROGRESS NOTE DETAILS
pt re-assessed, cellulitis appears improved with right lateral leg ulcerations. will dc today with abx.

## 2024-02-12 NOTE — ED CDU PROVIDER DISPOSITION NOTE - PATIENT PORTAL LINK FT
You can access the FollowMyHealth Patient Portal offered by [facility] by registering at the following website: [email or web address]. By joining [facility]’s FollowMyHealth portal, you will also be able to view your health information using other applications (apps) compatible with our system.

## 2024-02-14 LAB
CULTURE RESULTS: SIGNIFICANT CHANGE UP
CULTURE RESULTS: SIGNIFICANT CHANGE UP
SPECIMEN SOURCE: SIGNIFICANT CHANGE UP
SPECIMEN SOURCE: SIGNIFICANT CHANGE UP

## 2025-03-31 NOTE — ED ADULT NURSE NOTE - HIV OFFER
